# Patient Record
Sex: FEMALE | Race: WHITE | NOT HISPANIC OR LATINO | Employment: FULL TIME | URBAN - METROPOLITAN AREA
[De-identification: names, ages, dates, MRNs, and addresses within clinical notes are randomized per-mention and may not be internally consistent; named-entity substitution may affect disease eponyms.]

---

## 2020-05-20 ENCOUNTER — TELEPHONE (OUTPATIENT)
Dept: UROLOGY | Facility: MEDICAL CENTER | Age: 45
End: 2020-05-20

## 2020-06-08 ENCOUNTER — TELEPHONE (OUTPATIENT)
Dept: UROLOGY | Facility: MEDICAL CENTER | Age: 45
End: 2020-06-08

## 2020-09-17 ENCOUNTER — OFFICE VISIT (OUTPATIENT)
Dept: UROLOGY | Facility: CLINIC | Age: 45
End: 2020-09-17
Payer: COMMERCIAL

## 2020-09-17 VITALS
HEART RATE: 84 BPM | DIASTOLIC BLOOD PRESSURE: 78 MMHG | SYSTOLIC BLOOD PRESSURE: 130 MMHG | WEIGHT: 165.2 LBS | BODY MASS INDEX: 30.4 KG/M2 | TEMPERATURE: 97.3 F | HEIGHT: 62 IN

## 2020-09-17 DIAGNOSIS — N20.0 NEPHROLITHIASIS: Primary | ICD-10-CM

## 2020-09-17 LAB — POST-VOID RESIDUAL VOLUME, ML POC: 0 ML

## 2020-09-17 PROCEDURE — 51798 US URINE CAPACITY MEASURE: CPT | Performed by: PHYSICIAN ASSISTANT

## 2020-09-17 PROCEDURE — 99203 OFFICE O/P NEW LOW 30 MIN: CPT | Performed by: PHYSICIAN ASSISTANT

## 2020-09-17 RX ORDER — MEDROXYPROGESTERONE ACETATE 150 MG/ML
INJECTION, SUSPENSION INTRAMUSCULAR
COMMUNITY
Start: 2020-09-15

## 2020-09-17 RX ORDER — FAMOTIDINE 40 MG/1
40 TABLET, FILM COATED ORAL DAILY
COMMUNITY
Start: 2020-06-24

## 2020-09-17 RX ORDER — FLUTICASONE PROPIONATE 50 MCG
SPRAY, SUSPENSION (ML) NASAL
COMMUNITY
Start: 2020-09-08

## 2020-09-17 RX ORDER — ERGOCALCIFEROL 1.25 MG/1
CAPSULE ORAL WEEKLY
COMMUNITY
Start: 2020-08-24

## 2020-09-17 NOTE — PROGRESS NOTES
1  Nephrolithiasis  POCT Measure PVR    CT renal stone study abdomen pelvis wo contrast       Assessment and plan:       1  History of kidney stones  -I reviewed with the patient that her previous urine specimen was negative for hematuria, however calcium oxalate crystals were noted  Given her flank discomfort in history of kidney stones, we will obtain a CT stone study for further evaluation  Reviewed proper hydration and dietary modifications in the meantime  All questions answered  Eliana Pemberton PA-C      Chief Complaint     History of kidney stones    History of Present Illness     Keisha Calero is a 39 y o  female presenting today as a new patient for kidney stones  Patient had a recent ultrasound of the abdomen (01/28/2020) which was negative for any evidence of stones or obstruction  Previous CT of the abdomen pelvis in 2018 had revealed a small you left UVJ stone at that time  Patient had a urinalysis with microscopy (09/01/2020) which was negative for microscopic hematuria or evidence of infection  Reported calcium oxalate crystals were documented  Patient denies any dysuria or gross hematuria, however has noted some bilateral flank pain over the past 2-3 months  She is unable to isolate any precipitating events  She denies any radiation of pain  Denies any in a vomiting or nausea  Denies any fevers or chills  PVR 0mL    Laboratory         Review of Systems     Review of Systems   Constitutional: Negative for activity change, appetite change, chills, diaphoresis, fatigue, fever and unexpected weight change  Respiratory: Negative for chest tightness and shortness of breath  Cardiovascular: Negative for chest pain, palpitations and leg swelling  Gastrointestinal: Negative for abdominal distention, abdominal pain, constipation, diarrhea, nausea and vomiting     Genitourinary: Negative for decreased urine volume, difficulty urinating, dysuria, enuresis, flank pain, frequency, genital sores, hematuria and urgency  Musculoskeletal: Negative for back pain, gait problem and myalgias  Skin: Negative for color change, pallor, rash and wound  Psychiatric/Behavioral: Negative for behavioral problems  The patient is not nervous/anxious  Allergies     No Known Allergies    Physical Exam     Physical Exam  Constitutional:       General: She is not in acute distress  Appearance: Normal appearance  She is normal weight  She is not ill-appearing, toxic-appearing or diaphoretic  HENT:      Head: Normocephalic and atraumatic  Eyes:      General:         Right eye: No discharge  Left eye: No discharge  Conjunctiva/sclera: Conjunctivae normal    Pulmonary:      Effort: Pulmonary effort is normal  No respiratory distress  Abdominal:      Comments: Mild bilateral CVA tenderness   Skin:     General: Skin is warm and dry  Coloration: Skin is not jaundiced or pale  Neurological:      General: No focal deficit present  Mental Status: She is alert and oriented to person, place, and time  Psychiatric:         Mood and Affect: Mood normal          Behavior: Behavior normal          Thought Content:  Thought content normal            Vital Signs     Vitals:    09/17/20 0903   BP: 130/78   BP Location: Left arm   Patient Position: Sitting   Cuff Size: Standard   Pulse: 84   Temp: (!) 97 3 °F (36 3 °C)   Weight: 74 9 kg (165 lb 3 2 oz)   Height: 5' 2" (1 575 m)         Current Medications       Current Outpatient Medications:     ergocalciferol (VITAMIN D2) 50,000 units, Take by mouth once a week, Disp: , Rfl:     famotidine (PEPCID) 40 MG tablet, Take 40 mg by mouth daily, Disp: , Rfl:     fluticasone (FLONASE) 50 mcg/act nasal spray, , Disp: , Rfl:     medroxyPROGESTERone acetate (DEPO-PROVERA SYRINGE) 150 mg/mL injection, SINGLE INJECTION TO BE GIVEN AT OFFICE, Disp: , Rfl:       Active Problems     There is no problem list on file for this patient  Past Medical History     History reviewed  No pertinent past medical history  Surgical History     History reviewed  No pertinent surgical history  Family History     History reviewed  No pertinent family history        Social History     Social History       Radiology

## 2020-09-25 ENCOUNTER — HOSPITAL ENCOUNTER (OUTPATIENT)
Dept: CT IMAGING | Facility: HOSPITAL | Age: 45
Discharge: HOME | End: 2020-09-25
Payer: COMMERCIAL

## 2020-09-25 DIAGNOSIS — N20.0 NEPHROLITHIASIS: ICD-10-CM

## 2020-09-25 PROCEDURE — G1004 CDSM NDSC: HCPCS

## 2020-09-25 PROCEDURE — 74176 CT ABD & PELVIS W/O CONTRAST: CPT

## 2020-10-29 ENCOUNTER — OFFICE VISIT (OUTPATIENT)
Dept: UROLOGY | Facility: CLINIC | Age: 45
End: 2020-10-29
Payer: COMMERCIAL

## 2020-10-29 VITALS
WEIGHT: 172 LBS | DIASTOLIC BLOOD PRESSURE: 84 MMHG | HEIGHT: 62 IN | BODY MASS INDEX: 31.65 KG/M2 | SYSTOLIC BLOOD PRESSURE: 118 MMHG | TEMPERATURE: 98.2 F | HEART RATE: 94 BPM

## 2020-10-29 DIAGNOSIS — R10.9 FLANK PAIN: Primary | ICD-10-CM

## 2020-10-29 PROCEDURE — 99203 OFFICE O/P NEW LOW 30 MIN: CPT | Performed by: PHYSICIAN ASSISTANT

## 2020-10-29 RX ORDER — SUMATRIPTAN 25 MG/1
25 TABLET, FILM COATED ORAL ONCE AS NEEDED
COMMUNITY

## 2020-10-29 RX ORDER — ACETAMINOPHEN 325 MG/1
TABLET ORAL
COMMUNITY
Start: 2020-09-21

## 2020-10-29 RX ORDER — IBUPROFEN 600 MG/1
TABLET ORAL
COMMUNITY
Start: 2020-09-25

## 2020-10-29 RX ORDER — ASPIRIN 81 MG/1
81 TABLET ORAL DAILY
COMMUNITY

## 2020-10-29 RX ORDER — ALBUTEROL SULFATE 90 UG/1
2 AEROSOL, METERED RESPIRATORY (INHALATION) EVERY 6 HOURS PRN
COMMUNITY

## 2023-05-23 ENCOUNTER — TELEPHONE (OUTPATIENT)
Dept: GASTROENTEROLOGY | Facility: CLINIC | Age: 48
End: 2023-05-23

## 2023-05-23 NOTE — TELEPHONE ENCOUNTER
LMOM for patient to phone back an schedule a new patient appt with Dr Nurys Steward for gerd  Demarco Grijalva

## 2023-09-13 ENCOUNTER — TELEPHONE (OUTPATIENT)
Age: 48
End: 2023-09-13

## 2023-09-13 NOTE — TELEPHONE ENCOUNTER
Called patient lmom for patient to call the office patient's Jignesh Pat is inactive. Please get patient's updated insurance. Thank you.

## 2023-09-13 NOTE — TELEPHONE ENCOUNTER
Called patient lmom for patient to call the office the insurance P Family came back patient is not covered. Please give updated insurance. Thank you. IF PATIENT HAS    [facility] MEDICAID - IT IS NOT ACCEPTED IN [place]. THIS WOULD BE WHY IT IS COMING BACK NOT ACCEPTED. Patient needs to be seen by GI in [place].  Haroldine Opitz is close

## 2023-09-14 ENCOUNTER — OFFICE VISIT (OUTPATIENT)
Age: 48
End: 2023-09-14
Payer: COMMERCIAL

## 2023-09-14 VITALS
SYSTOLIC BLOOD PRESSURE: 118 MMHG | HEIGHT: 63 IN | HEART RATE: 74 BPM | BODY MASS INDEX: 25.69 KG/M2 | WEIGHT: 145 LBS | DIASTOLIC BLOOD PRESSURE: 74 MMHG | OXYGEN SATURATION: 98 %

## 2023-09-14 DIAGNOSIS — E61.1 IRON DEFICIENCY: Primary | ICD-10-CM

## 2023-09-14 DIAGNOSIS — Z12.11 SCREENING FOR COLON CANCER: ICD-10-CM

## 2023-09-14 DIAGNOSIS — K21.9 GASTROESOPHAGEAL REFLUX DISEASE WITHOUT ESOPHAGITIS: ICD-10-CM

## 2023-09-14 PROCEDURE — 99204 OFFICE O/P NEW MOD 45 MIN: CPT | Performed by: PHYSICIAN ASSISTANT

## 2023-09-14 RX ORDER — SELENIUM 50 MCG
TABLET ORAL
COMMUNITY

## 2023-09-14 RX ORDER — ESTROGENS, CONJUGATED 0.9 MG
0.9 TABLET ORAL DAILY
COMMUNITY
Start: 2023-07-20

## 2023-09-14 RX ORDER — OMEPRAZOLE 40 MG/1
40 CAPSULE, DELAYED RELEASE ORAL 2 TIMES DAILY
Qty: 60 CAPSULE | Refills: 1 | Status: SHIPPED | OUTPATIENT
Start: 2023-09-14 | End: 2023-10-14

## 2023-09-14 RX ORDER — CHLORAL HYDRATE 500 MG
1000 CAPSULE ORAL DAILY
COMMUNITY

## 2023-09-14 RX ORDER — OMEPRAZOLE 40 MG/1
CAPSULE, DELAYED RELEASE ORAL
COMMUNITY
End: 2023-09-14 | Stop reason: ALTCHOICE

## 2023-09-14 RX ORDER — CYCLOBENZAPRINE HCL 10 MG
TABLET ORAL
COMMUNITY
Start: 2023-08-23

## 2023-09-14 RX ORDER — FERROUS SULFATE 325(65) MG
1 TABLET ORAL
COMMUNITY
Start: 2023-08-26

## 2023-09-14 RX ORDER — TRAZODONE HYDROCHLORIDE 50 MG/1
50 TABLET ORAL DAILY PRN
COMMUNITY
Start: 2023-05-02

## 2023-09-14 RX ORDER — LORATADINE 10 MG/1
10 CAPSULE, LIQUID FILLED ORAL DAILY
COMMUNITY

## 2023-09-14 RX ORDER — METHYLPREDNISOLONE 4 MG/1
TABLET ORAL
COMMUNITY
Start: 2023-08-23

## 2023-09-14 RX ORDER — LIDOCAINE 50 MG/G
PATCH TOPICAL
COMMUNITY
Start: 2023-06-23

## 2023-09-14 RX ORDER — MONTELUKAST SODIUM 10 MG/1
TABLET ORAL
COMMUNITY
Start: 2023-08-31

## 2023-09-14 RX ORDER — CYCLOSPORINE 0.5 MG/ML
EMULSION OPHTHALMIC
COMMUNITY

## 2023-09-14 NOTE — PROGRESS NOTES
[facility] - Outpatient Consultation  Ben Hung 50 y.o. female MRN: [id number]  Encounter: [id number]          ASSESSMENT AND PLAN:      1. Iron deficiency  2. Gastroesophageal reflux disease  3. Screening for colon cancer    Patient presents for an GI evaluation. She reports recent blood work showed an iron deficiency and she is on iron supplementation. She also reports of struggling with reflux symptoms despite taking Omeprazole daily. No obvious melena or rectal bleeding. She has never had an EGD or colonoscopy. Will plan for EGD with biopsies of the stomach and duodenum and colonoscopy to investigate. Will increase her Omeprazole to BID for 6 weeks for her GERD.  ______________________________________________________________________    HPI:  Patient is a pleasant 50year old female with a history of bladder cancer who presents to the office for a gastrointestinal evaluation. Patient reports she recently had blood work which showed an iron deficiency. She reports her HGB was normal.  I do not have the labs available for review. She has been struggling with reflux symptoms over the past 6 months. She is on Omeprazole daily but is still having reflux and heartburn. She reports occasional dysphagia. No melena or rectal bleeding. No frequent NSAIDs. She stopped smoking a couple years ago. No family history of esophageal, stomach, or colon cancer. REVIEW OF SYSTEMS:    CONSTITUTIONAL: Denies any fever, chills, rigors, and weight loss. HEENT: No earache or tinnitus. Denies hearing loss or visual disturbances. CARDIOVASCULAR: No chest pain or palpitations. RESPIRATORY: Denies any cough, hemoptysis, shortness of breath or dyspnea on exertion. GASTROINTESTINAL: As noted in the History of Present Illness. GENITOURINARY: No problems with urination. Denies any hematuria or dysuria. NEUROLOGIC: No dizziness or vertigo, denies headaches.    MUSCULOSKELETAL: Denies any muscle or joint pain. SKIN: Denies skin rashes or itching. ENDOCRINE: Denies excessive thirst. Denies intolerance to heat or cold. PSYCHOSOCIAL: Denies depression or anxiety. Denies any recent memory loss.        Historical Information   Past Medical History:   Diagnosis Date   • History of bladder cancer      Past Surgical History:   Procedure Laterality Date   • BLADDER TUMOR EXCISION  2021 November & December   • TOTAL ABDOMINAL HYSTERECTOMY  2021     Social History   Social History     Substance and Sexual Activity   Alcohol Use Yes     Social History     Substance and Sexual Activity   Drug Use Never     Social History     Tobacco Use   Smoking Status Every Day   Smokeless Tobacco Never     Family History   Problem Relation Age of Onset   • No Known Problems Mother    • Aneurysm Father        Meds/Allergies       Current Outpatient Medications:   •  acetaminophen (TYLENOL) 325 mg tablet  •  albuterol (PROVENTIL HFA,VENTOLIN HFA) 90 mcg/act inhaler  •  cyclobenzaprine (FLEXERIL) 10 mg tablet  •  cycloSPORINE (Restasis) 0.05 % ophthalmic emulsion  •  FeroSul 325 (65 Fe) MG tablet  •  fluticasone (FLONASE) 50 mcg/act nasal spray  •  ibuprofen (MOTRIN) 600 mg tablet  •  lactobacillus acidophilus  •  lidocaine (LIDODERM) 5 %  •  Loratadine 10 MG CAPS  •  montelukast (SINGULAIR) 10 mg tablet  •  Omega-3 Fatty Acids (fish oil) 1,000 mg  •  omeprazole (PriLOSEC) 40 MG capsule  •  Premarin 0.9 MG tablet  •  SUMAtriptan (IMITREX) 25 mg tablet  •  traZODone (DESYREL) 50 mg tablet  •  aspirin (ECOTRIN LOW STRENGTH) 81 mg EC tablet  •  Cyanocobalamin (VITAMIN B-12 PO)  •  ergocalciferol (VITAMIN D2) 50,000 units  •  famotidine (PEPCID) 40 MG tablet  •  medroxyPROGESTERone acetate (DEPO-PROVERA SYRINGE) 150 mg/mL injection  •  methylPREDNISolone 4 MG tablet therapy pack    Allergies   Allergen Reactions   • Erythromycin GI Intolerance   • Bactrim [Sulfamethoxazole-Trimethoprim] Hives   • Avelox [Moxifloxacin] Anxiety           Objective     Blood pressure 118/74, pulse 74, height 5' 3" (1.6 m), weight 65.8 kg (145 lb), SpO2 98 %. Body mass index is 25.69 kg/m². PHYSICAL EXAM:      General Appearance:   Alert, cooperative, no distress   HEENT:   Normocephalic, atraumatic, anicteric.     Neck:  Supple, symmetrical, trachea midline   Lungs:   Clear to auscultation bilaterally; no rales, rhonchi or wheezing; respirations unlabored    Heart[de-identified]   Regular rate and rhythm; no murmur, rub, or gallop. Abdomen:   Soft, non-tender, non-distended; normal bowel sounds; no masses, no organomegaly    Genitalia:   Deferred    Rectal:   Deferred    Extremities:  No cyanosis, clubbing or edema    Pulses:  2+ and symmetric    Skin:  No jaundice, rashes, or lesions    Lymph nodes:  No palpable cervical lymphadenopathy        Lab Results:   No visits with results within 1 Day(s) from this visit. Latest known visit with results is:   Office Visit on 09/17/2020   Component Date Value   • POST-VOID RESIDUAL VOLUM* 09/17/2020 0          Radiology Results:   MAMMOGRAM SCREENING CONNIE BILATERAL    Result Date: 8/31/2023  Narrative: CLINICAL HISTORY: Screening mammogram. Bilateral digital mammography was performed in the MLO and CC projections, computer assisted detection was utilized.    3-D connie synthesis was employed. COMPARISON:  August 2022 and earlier. FINDINGS: There is no evidence of suspicious mass or suspicious clustered microcalcifications.   No skin thickening or suspicious architectural distortion is seen. __    Impression: IMPRESSION: No mammographic findings felt to be suspicious for malignancy.  No suspicious interval change when compared to the prior study. Recommend routine mammographic follow-up in one year's time unless this patient's clinical signs and symptoms indicate the need for even sooner mammographic reevaluation.  BIRADS CATEGORY 1: Negative BIRAD  Assessment for Breast Imaging Reference BI-RADS 0 = Incomplete - need additional imaging evaluation.   1 = Negative mammogram.   2 = Benign findings.   3 = Probable benign - short interval follow up suggested.  4 = Suspicious abnormality, biopsy should be considered.   5 = Highly suggested of malignancy.   6 = Known biopsy - proven malignancy - appropriate action should be taken BIRAD Density Type B:  There are scattered areas of fibroglandular density Signed by Ben Shaffer MD 08/31/2023 14:57

## 2023-09-14 NOTE — PATIENT INSTRUCTIONS
Scheduled date of EGD/colonoscopy (as of today): 11/7/23  Physician performing EGD/colonoscopy: Noe Artis  Location of EGD/colonoscopy: [address] [place] [address]  Desired bowel prep reviewed with patient: Miralax  Instructions reviewed with patient by: Daniella WALKER  Clearances:

## 2023-09-14 NOTE — LETTER
September 19, 2023     Abdoulaye Velasquez, [address] [facility]    Patient: Alexandria Walalce   YOB: 1975   Date of Visit: 9/14/2023       Dear Dr. Stanford Bodily: Thank you for referring Alexandria Wallace to me for evaluation. Below are my notes for this consultation. If you have questions, please do not hesitate to call me. I look forward to following your patient along with you. Sincerely,        Alan Gates PA-C        CC: No Recipients    Tate Robledo  9/14/2023  2:59 PM  Attested  [facility] - Outpatient Consultation  Alexandria Wallace 50 y.o. female MRN: [id number]  Encounter: [id number]          ASSESSMENT AND PLAN:      1. Iron deficiency  2. Gastroesophageal reflux disease  3. Screening for colon cancer    Patient presents for an GI evaluation. She reports recent blood work showed an iron deficiency and she is on iron supplementation. She also reports of struggling with reflux symptoms despite taking Omeprazole daily. No obvious melena or rectal bleeding. She has never had an EGD or colonoscopy. Will plan for EGD with biopsies of the stomach and duodenum and colonoscopy to investigate. Will increase her Omeprazole to BID for 6 weeks for her GERD.  ______________________________________________________________________    HPI:  Patient is a pleasant 50year old female with a history of bladder cancer who presents to the office for a gastrointestinal evaluation. Patient reports she recently had blood work which showed an iron deficiency. She reports her HGB was normal.  I do not have the labs available for review. She has been struggling with reflux symptoms over the past 6 months. She is on Omeprazole daily but is still having reflux and heartburn. She reports occasional dysphagia. No melena or rectal bleeding. No frequent NSAIDs. She stopped smoking a couple years ago.   No family history of esophageal, stomach, or colon cancer. REVIEW OF SYSTEMS:    CONSTITUTIONAL: Denies any fever, chills, rigors, and weight loss. HEENT: No earache or tinnitus. Denies hearing loss or visual disturbances. CARDIOVASCULAR: No chest pain or palpitations. RESPIRATORY: Denies any cough, hemoptysis, shortness of breath or dyspnea on exertion. GASTROINTESTINAL: As noted in the History of Present Illness. GENITOURINARY: No problems with urination. Denies any hematuria or dysuria. NEUROLOGIC: No dizziness or vertigo, denies headaches. MUSCULOSKELETAL: Denies any muscle or joint pain. SKIN: Denies skin rashes or itching. ENDOCRINE: Denies excessive thirst. Denies intolerance to heat or cold. PSYCHOSOCIAL: Denies depression or anxiety. Denies any recent memory loss.        Historical Information   Past Medical History:   Diagnosis Date   • History of bladder cancer      Past Surgical History:   Procedure Laterality Date   • BLADDER TUMOR EXCISION  2021 November & December   • TOTAL ABDOMINAL HYSTERECTOMY  2021     Social History   Social History     Substance and Sexual Activity   Alcohol Use Yes     Social History     Substance and Sexual Activity   Drug Use Never     Social History     Tobacco Use   Smoking Status Every Day   Smokeless Tobacco Never     Family History   Problem Relation Age of Onset   • No Known Problems Mother    • Aneurysm Father        Meds/Allergies       Current Outpatient Medications:   •  acetaminophen (TYLENOL) 325 mg tablet  •  albuterol (PROVENTIL HFA,VENTOLIN HFA) 90 mcg/act inhaler  •  cyclobenzaprine (FLEXERIL) 10 mg tablet  •  cycloSPORINE (Restasis) 0.05 % ophthalmic emulsion  •  FeroSul 325 (65 Fe) MG tablet  •  fluticasone (FLONASE) 50 mcg/act nasal spray  •  ibuprofen (MOTRIN) 600 mg tablet  •  lactobacillus acidophilus  •  lidocaine (LIDODERM) 5 %  •  Loratadine 10 MG CAPS  •  montelukast (SINGULAIR) 10 mg tablet  •  Omega-3 Fatty Acids (fish oil) 1,000 mg  •  omeprazole (PriLOSEC) 40 MG capsule  • Premarin 0.9 MG tablet  •  SUMAtriptan (IMITREX) 25 mg tablet  •  traZODone (DESYREL) 50 mg tablet  •  aspirin (ECOTRIN LOW STRENGTH) 81 mg EC tablet  •  Cyanocobalamin (VITAMIN B-12 PO)  •  ergocalciferol (VITAMIN D2) 50,000 units  •  famotidine (PEPCID) 40 MG tablet  •  medroxyPROGESTERone acetate (DEPO-PROVERA SYRINGE) 150 mg/mL injection  •  methylPREDNISolone 4 MG tablet therapy pack    Allergies   Allergen Reactions   • Erythromycin GI Intolerance   • Bactrim [Sulfamethoxazole-Trimethoprim] Hives   • Avelox [Moxifloxacin] Anxiety           Objective     Blood pressure 118/74, pulse 74, height 5' 3" (1.6 m), weight 65.8 kg (145 lb), SpO2 98 %. Body mass index is 25.69 kg/m². PHYSICAL EXAM:      General Appearance:   Alert, cooperative, no distress   HEENT:   Normocephalic, atraumatic, anicteric. Neck:  Supple, symmetrical, trachea midline   Lungs:   Clear to auscultation bilaterally; no rales, rhonchi or wheezing; respirations unlabored    Heart[de-identified]   Regular rate and rhythm; no murmur, rub, or gallop. Abdomen:   Soft, non-tender, non-distended; normal bowel sounds; no masses, no organomegaly    Genitalia:   Deferred    Rectal:   Deferred    Extremities:  No cyanosis, clubbing or edema    Pulses:  2+ and symmetric    Skin:  No jaundice, rashes, or lesions    Lymph nodes:  No palpable cervical lymphadenopathy        Lab Results:   No visits with results within 1 Day(s) from this visit. Latest known visit with results is:   Office Visit on 09/17/2020   Component Date Value   • POST-VOID RESIDUAL VOLUM* 09/17/2020 0          Radiology Results:   MAMMOGRAM SCREENING CONNIE BILATERAL    Result Date: 8/31/2023  Narrative: CLINICAL HISTORY: Screening mammogram. Bilateral digital mammography was performed in the MLO and CC projections, computer assisted detection was utilized. 3-D connie synthesis was employed. COMPARISON:  August 2022 and earlier.  FINDINGS: There is no evidence of suspicious mass or suspicious clustered microcalcifications. No skin thickening or suspicious architectural distortion is seen. __    Impression: IMPRESSION: No mammographic findings felt to be suspicious for malignancy. No suspicious interval change when compared to the prior study. Recommend routine mammographic follow-up in one year's time unless this patient's clinical signs and symptoms indicate the need for even sooner mammographic reevaluation. BIRADS CATEGORY 1: Negative BIRAD  Assessment for Breast Imaging Reference BI-RADS 0 = Incomplete - need additional imaging evaluation. 1 = Negative mammogram.   2 = Benign findings. 3 = Probable benign - short interval follow up suggested. 4 = Suspicious abnormality, biopsy should be considered. 5 = Highly suggested of malignancy. 6 = Known biopsy - proven malignancy - appropriate action should be taken BIRAD Density Type B:  There are scattered areas of fibroglandular density Signed by Kian Rojas MD 08/31/2023 14:57  Attestation signed by Herbert Bhagat MD at 9/14/2023  3:22 PM:  I supervised the Advanced Practitioner. I reviewed the Advanced Practitioner note and agree.     Herbert Bhagat MD 09/14/23

## 2023-09-27 ENCOUNTER — TELEPHONE (OUTPATIENT)
Age: 48
End: 2023-09-27

## 2023-09-27 NOTE — TELEPHONE ENCOUNTER
Patient calling to reschedule her colonoscopy. Colonoscopy is rescheduled for 11/14/23 with  at [facility]. Patient has prep information.

## 2023-11-14 ENCOUNTER — ANESTHESIA (OUTPATIENT)
Dept: GASTROENTEROLOGY | Facility: HOSPITAL | Age: 48
End: 2023-11-14

## 2023-11-14 ENCOUNTER — HOSPITAL ENCOUNTER (OUTPATIENT)
Dept: GASTROENTEROLOGY | Facility: HOSPITAL | Age: 48
Setting detail: OUTPATIENT SURGERY
Discharge: HOME | End: 2023-11-14
Payer: COMMERCIAL

## 2023-11-14 ENCOUNTER — ANESTHESIA EVENT (OUTPATIENT)
Dept: GASTROENTEROLOGY | Facility: HOSPITAL | Age: 48
End: 2023-11-14

## 2023-11-14 ENCOUNTER — TELEPHONE (OUTPATIENT)
Age: 48
End: 2023-11-14

## 2023-11-14 VITALS
HEIGHT: 62 IN | HEART RATE: 60 BPM | RESPIRATION RATE: 16 BRPM | DIASTOLIC BLOOD PRESSURE: 68 MMHG | OXYGEN SATURATION: 99 % | BODY MASS INDEX: 27.59 KG/M2 | TEMPERATURE: 97.5 F | SYSTOLIC BLOOD PRESSURE: 110 MMHG | WEIGHT: 149.91 LBS

## 2023-11-14 DIAGNOSIS — E61.1 IRON DEFICIENCY: ICD-10-CM

## 2023-11-14 DIAGNOSIS — Z12.11 SCREENING FOR COLON CANCER: ICD-10-CM

## 2023-11-14 DIAGNOSIS — K21.9 GASTROESOPHAGEAL REFLUX DISEASE WITHOUT ESOPHAGITIS: ICD-10-CM

## 2023-11-14 PROBLEM — J45.909 ASTHMA: Status: ACTIVE | Noted: 2023-11-14

## 2023-11-14 PROBLEM — D64.9 ANEMIA: Status: ACTIVE | Noted: 2023-11-14

## 2023-11-14 PROBLEM — F41.9 ANXIETY: Status: ACTIVE | Noted: 2023-11-14

## 2023-11-14 PROBLEM — Z85.51 HISTORY OF BLADDER CANCER: Status: ACTIVE | Noted: 2023-11-14

## 2023-11-14 RX ORDER — PROPOFOL 10 MG/ML
INJECTION, EMULSION INTRAVENOUS AS NEEDED
Status: DISCONTINUED | OUTPATIENT
Start: 2023-11-14 | End: 2023-11-14

## 2023-11-14 RX ORDER — LIDOCAINE HYDROCHLORIDE 20 MG/ML
INJECTION, SOLUTION EPIDURAL; INFILTRATION; INTRACAUDAL; PERINEURAL AS NEEDED
Status: DISCONTINUED | OUTPATIENT
Start: 2023-11-14 | End: 2023-11-14

## 2023-11-14 RX ORDER — SODIUM CHLORIDE, SODIUM LACTATE, POTASSIUM CHLORIDE, CALCIUM CHLORIDE 600; 310; 30; 20 MG/100ML; MG/100ML; MG/100ML; MG/100ML
INJECTION, SOLUTION INTRAVENOUS CONTINUOUS PRN
Status: DISCONTINUED | OUTPATIENT
Start: 2023-11-14 | End: 2023-11-14

## 2023-11-14 RX ADMIN — PROPOFOL 30 MG: 10 INJECTION, EMULSION INTRAVENOUS at 09:24

## 2023-11-14 RX ADMIN — PROPOFOL 30 MG: 10 INJECTION, EMULSION INTRAVENOUS at 09:12

## 2023-11-14 RX ADMIN — PROPOFOL 30 MG: 10 INJECTION, EMULSION INTRAVENOUS at 09:19

## 2023-11-14 RX ADMIN — LIDOCAINE HYDROCHLORIDE 100 MG: 20 INJECTION, SOLUTION EPIDURAL; INFILTRATION; INTRACAUDAL; PERINEURAL at 09:11

## 2023-11-14 RX ADMIN — PROPOFOL 120 MG: 10 INJECTION, EMULSION INTRAVENOUS at 09:11

## 2023-11-14 RX ADMIN — PROPOFOL 30 MG: 10 INJECTION, EMULSION INTRAVENOUS at 09:28

## 2023-11-14 RX ADMIN — PROPOFOL 50 MG: 10 INJECTION, EMULSION INTRAVENOUS at 09:32

## 2023-11-14 RX ADMIN — PROPOFOL 30 MG: 10 INJECTION, EMULSION INTRAVENOUS at 09:35

## 2023-11-14 RX ADMIN — SODIUM CHLORIDE, SODIUM LACTATE, POTASSIUM CHLORIDE, AND CALCIUM CHLORIDE: .6; .31; .03; .02 INJECTION, SOLUTION INTRAVENOUS at 09:07

## 2023-11-14 RX ADMIN — PROPOFOL 50 MG: 10 INJECTION, EMULSION INTRAVENOUS at 09:15

## 2023-11-14 NOTE — H&P
History and Physical - [facility]  Kathy Jones 50 y.o. female MRN: [id number]                  HPI: Kathy Jones is a 50y.o. year old female who presents for EGD and colonoscopy for iron deficiency anemia, GERD. No prior colonoscopy      REVIEW OF SYSTEMS: Per the HPI, and otherwise unremarkable. Historical Information   Past Medical History:   Diagnosis Date    History of bladder cancer      Past Surgical History:   Procedure Laterality Date    BLADDER TUMOR EXCISION  2021 November & December    TOTAL ABDOMINAL HYSTERECTOMY  2021     Social History   Social History     Substance and Sexual Activity   Alcohol Use Yes     Social History     Substance and Sexual Activity   Drug Use Never     Social History     Tobacco Use   Smoking Status Every Day   Smokeless Tobacco Never     Family History   Problem Relation Age of Onset    No Known Problems Mother     Aneurysm Father        Meds/Allergies     (Not in a hospital admission)      Allergies   Allergen Reactions    Erythromycin GI Intolerance    Bactrim [Sulfamethoxazole-Trimethoprim] Hives    Avelox [Moxifloxacin] Anxiety       Objective     There were no vitals taken for this visit.       PHYSICAL EXAM    Gen: NAD  CV: RRR  CHEST: Clear  ABD: soft, NT/ND  EXT: no edema  Neuro: AAO      ASSESSMENT/PLAN:  This is a 50y.o. year old female here for iron deficiency anemia, GERD    PLAN:   Procedure: EGD and colonoscopy

## 2023-11-14 NOTE — TELEPHONE ENCOUNTER
----- Message from Vanessa Lewis MD sent at 11/14/2023  9:39 AM EST -----  Please set up for capsule for iron deficiency anemia

## 2023-11-14 NOTE — INTERVAL H&P NOTE
H&P reviewed. After examining the patient I find no changes in the patients condition since the H&P had been written.     Vitals:    11/14/23 0848   BP: 134/73   Pulse: 74   Resp: 19   Temp: (!) 97.3 °F (36.3 °C)   SpO2: 100%

## 2023-11-14 NOTE — ANESTHESIA PREPROCEDURE EVALUATION
Procedure:  EGD  COLONOSCOPY    No albuterol use in last month    Relevant Problems   GI/HEPATIC   (+) GERD (gastroesophageal reflux disease)      HEMATOLOGY   (+) Anemia      NEURO/PSYCH   (+) Anxiety      PULMONARY   (+) Asthma      Other   (+) History of bladder cancer        Physical Exam    Airway    Mallampati score: II  TM Distance: >3 FB  Neck ROM: full     Dental       Cardiovascular      Pulmonary      Other Findings        Anesthesia Plan  ASA Score- 2     Anesthesia Type- IV sedation with anesthesia with ASA Monitors. Additional Monitors:     Airway Plan:     Comment: Recent labs personally reviewed:  No results found for: "WBC", "HGB", "PLT"  No results found for: "NA", "K", "BUN", "CREATININE", "GLUCOSE"  No results found for: "PTT"   No results found for: "INR"    Blood type     I, Kaci Danielle MD, have personally seen and evaluated the patient prior to anesthetic care. I have reviewed the pre-anesthetic record, medical history, allergies, medications and any other medical records if appropriate to the anesthetic care. If a CRNA is involved in the case, I have reviewed the CRNA assessment, if present, and agree. Patient consented for IV Sedation, general anesthesia as back up. Discussed risks of aspiration, IV infiltration, indications for conversion to general anesthesia. All questions and concerns addressed. .       Plan Factors-Exercise tolerance (METS): >4 METS. Chart reviewed. Existing labs reviewed. Patient summary reviewed. Patient is not a current smoker. Patient did not smoke on day of surgery. Obstructive sleep apnea risk education given perioperatively. Induction- intravenous. Postoperative Plan-     Informed Consent- Anesthetic plan and risks discussed with patient. I personally reviewed this patient with the CRNA. Discussed and agreed on the Anesthesia Plan with the CRNA. Brenda Kelley

## 2023-11-15 NOTE — TELEPHONE ENCOUNTER
Patients GI provider:  NIR Urena    Number to return call: [phone]    Reason for call: Pt returning Alba's call to schedule pill cam. Please reach out to pt.     Scheduled procedure/appointment date if applicable: N/A

## 2023-11-17 NOTE — TELEPHONE ENCOUNTER
Patients GI provider:  NIR Urena     Number to return call: [phone]     Reason for call: Pt returning Alba's call to schedule pill cam. Please reach out to pt. Best time to reach patient is early morning and early afternoon.      Scheduled procedure/appointment date if applicable: N/A

## 2023-11-29 LAB
MISCELLANEOUS LAB TEST RESULT: NORMAL

## 2023-11-30 ENCOUNTER — TRANSCRIBE ORDERS (OUTPATIENT)
Age: 48
End: 2023-11-30

## 2023-11-30 DIAGNOSIS — D50.9 IRON DEFICIENCY ANEMIA, UNSPECIFIED IRON DEFICIENCY ANEMIA TYPE: Primary | ICD-10-CM

## 2023-12-18 ENCOUNTER — TELEPHONE (OUTPATIENT)
Age: 48
End: 2023-12-18

## 2023-12-18 ENCOUNTER — PROCEDURE VISIT (OUTPATIENT)
Age: 48
End: 2023-12-18
Payer: COMMERCIAL

## 2023-12-18 DIAGNOSIS — D50.9 IRON DEFICIENCY ANEMIA, UNSPECIFIED IRON DEFICIENCY ANEMIA TYPE: ICD-10-CM

## 2023-12-18 NOTE — TELEPHONE ENCOUNTER
Patient got capsule done today in office. She stated her macine was blinking and stopped. Call was transferred to office to further assist.

## 2023-12-19 ENCOUNTER — TELEPHONE (OUTPATIENT)
Age: 48
End: 2023-12-19

## 2023-12-19 PROCEDURE — 91110 GI TRC IMG INTRAL ESOPH-ILE: CPT | Performed by: INTERNAL MEDICINE
